# Patient Record
Sex: FEMALE | Race: WHITE
[De-identification: names, ages, dates, MRNs, and addresses within clinical notes are randomized per-mention and may not be internally consistent; named-entity substitution may affect disease eponyms.]

---

## 2022-07-15 ENCOUNTER — HOSPITAL ENCOUNTER (EMERGENCY)
Dept: HOSPITAL 33 - ED | Age: 27
Discharge: HOME | End: 2022-07-15
Payer: COMMERCIAL

## 2022-07-15 VITALS — DIASTOLIC BLOOD PRESSURE: 75 MMHG | SYSTOLIC BLOOD PRESSURE: 113 MMHG | HEART RATE: 68 BPM

## 2022-07-15 VITALS — OXYGEN SATURATION: 99 %

## 2022-07-15 DIAGNOSIS — Z79.899: ICD-10-CM

## 2022-07-15 DIAGNOSIS — R10.9: ICD-10-CM

## 2022-07-15 DIAGNOSIS — R73.9: ICD-10-CM

## 2022-07-15 DIAGNOSIS — R19.7: ICD-10-CM

## 2022-07-15 DIAGNOSIS — R11.2: Primary | ICD-10-CM

## 2022-07-15 DIAGNOSIS — E86.0: ICD-10-CM

## 2022-07-15 DIAGNOSIS — I10: ICD-10-CM

## 2022-07-15 LAB
ALBUMIN SERPL-MCNC: 3.9 G/DL (ref 3.5–5)
ALP SERPL-CCNC: 65 U/L (ref 38–126)
ALT SERPL-CCNC: 19 U/L (ref 0–35)
AMYLASE SERPL-CCNC: 63 U/L (ref 30–110)
ANION GAP SERPL CALC-SCNC: 10.7 MEQ/L (ref 5–15)
AST SERPL QL: 37 U/L (ref 14–36)
BASOPHILS # BLD AUTO: 0.02 X10^3/UL (ref 0–0.4)
BILIRUB BLD-MCNC: 0.6 MG/DL (ref 0.2–1.3)
BUN SERPL-MCNC: 7 MG/DL (ref 7–17)
CALCIUM SPEC-MCNC: 8.9 MG/DL (ref 8.4–10.2)
CHLORIDE SERPL-SCNC: 106 MMOL/L (ref 98–107)
CO2 SERPL-SCNC: 24 MMOL/L (ref 22–30)
CREAT SERPL-MCNC: 0.69 MG/DL (ref 0.52–1.04)
EOSINOPHIL # BLD AUTO: 0.05 X10^3/UL (ref 0–0.5)
GFR SERPLBLD BASED ON 1.73 SQ M-ARVRAT: > 60 ML/MIN
GLUCOSE SERPL-MCNC: 88 MG/DL (ref 74–106)
GLUCOSE UR-MCNC: NEGATIVE MG/DL
HCT VFR BLD AUTO: 43.9 % (ref 35–47)
HGB BLD-MCNC: 14.4 G/DL (ref 12–16)
LYMPHOCYTES # SPEC AUTO: 1.78 X10^3/UL (ref 1–4.6)
MCH RBC QN AUTO: 28.9 PG (ref 26–32)
MCHC RBC AUTO-ENTMCNC: 32.8 G/DL (ref 32–36)
MONOCYTES # BLD AUTO: 0.36 X10^3/UL (ref 0–1.3)
PLATELET # BLD AUTO: 239 X10^3/UL (ref 150–450)
POTASSIUM SERPLBLD-SCNC: 3.6 MMOL/L (ref 3.5–5.1)
PROT SERPL-MCNC: 6.9 G/DL (ref 6.3–8.2)
PROT UR STRIP-MCNC: NEGATIVE MG/DL
RBC # BLD AUTO: 4.99 X10^6/UL (ref 4.1–5.4)
RBC # UR AUTO: NEGATIVE ERY/UL (ref 0–5)
RBC #/AREA URNS HPF: (no result) /HPF (ref 0–2)
SODIUM SERPL-SCNC: 137 MMOL/L (ref 137–145)
UA DIPSTICK PNL UR: (no result)
URINE CULTURED INDICATED?: NO
WBC # BLD AUTO: 6.4 X10^3/UL (ref 4–10.5)
WBC #/AREA URNS HPF: (no result) /HPF (ref 0–5)

## 2022-07-15 PROCEDURE — 96375 TX/PRO/DX INJ NEW DRUG ADDON: CPT

## 2022-07-15 PROCEDURE — 81025 URINE PREGNANCY TEST: CPT

## 2022-07-15 PROCEDURE — 96360 HYDRATION IV INFUSION INIT: CPT

## 2022-07-15 PROCEDURE — 83605 ASSAY OF LACTIC ACID: CPT

## 2022-07-15 PROCEDURE — 36415 COLL VENOUS BLD VENIPUNCTURE: CPT

## 2022-07-15 PROCEDURE — 36000 PLACE NEEDLE IN VEIN: CPT

## 2022-07-15 PROCEDURE — 82150 ASSAY OF AMYLASE: CPT

## 2022-07-15 PROCEDURE — 85025 COMPLETE CBC W/AUTO DIFF WBC: CPT

## 2022-07-15 PROCEDURE — 96374 THER/PROPH/DIAG INJ IV PUSH: CPT

## 2022-07-15 PROCEDURE — 80053 COMPREHEN METABOLIC PANEL: CPT

## 2022-07-15 PROCEDURE — 81015 MICROSCOPIC EXAM OF URINE: CPT

## 2022-07-15 PROCEDURE — 99284 EMERGENCY DEPT VISIT MOD MDM: CPT

## 2022-07-15 NOTE — ERPHSYRPT
- History of Present Illness


Time Seen by Provider: 07/15/22 16:40


Patient Subjective Stated Complaint: PT SENT OVER FOR HIGH BLOOD SUGAR AND HIGH 

KCL TODAY, SHE WAS SEEN AT OFFICE FOR VOMITING, LOOSE STOOLS AND ABD PAIN FOR 3 

WEEKS, SHE WAS IN OUTPT FOR IV FLUIDS,


Triage Nursing Assessment: PT ALERT, ARRIVED PER WC, RESP EASY, FACE MASK IN 

PLACE, CO THRIST,


Physician History: 





This is a 27-year-old white female patient who was sent to us because of 3-week 

history of vomiting and diarrhea as well as some abdominal pain.  Patient had a 

recent CAT scan of the abdomen pelvis which showed diverticulosis but no acute 

findings.  Patient's symptoms have been persistent over this period of time and 

she was seen in the outpatient clinic where a blood sugar was taken and was 

found to be over 600 and a potassium reported as over 6.  Therefore, the patient

was sent to our emergency department for further evaluation and work-up.  Upon 

arrival to the emergency department, the patient is alert oriented pleasant with

no severe abdominal pain and had an Accu-Chek reading of a blood sugar of 86.  

Patient has no chest pain.  She has no shortness of breath.  Patient does have a

history of psychiatric issues and she has a history of high blood pressure.  

Patient states that they performed viral swabs earlier today and they were all 

negative per her report.


Severity: mild


Associated Symptoms: vomiting, other (Diarrhea)


Allergies/Adverse Reactions: 








bupropion [From Wellbutrin] Allergy (Severe, Verified 07/15/22 16:33)


   Tightness of Throat


Sulfa (Sulfonamide Antibiotics) Allergy (Severe, Verified 07/15/22 16:33)


   Difficulty Breathing


latex Allergy (Mild, Verified 07/15/22 16:33)


   Swelling





Home Medications: 








Fluvoxamine Maleate 50 mg PO DAILY 07/15/22 [History]


Metoprolol Succinate 25 mg Xl* [Toprol-Xl 25MG Tablets***] 25 mg PO DAILY 

07/15/22 [History]


Trazodone HCl 50 mg*** [Desyrel 50 mg***] 50 mg PO DAILY 07/15/22 [History]


lamoTRIgine [Lamictal] 50 mg PO DAILY 07/15/22 [History]





Hx Tetanus, Diphtheria Vaccination/Date Given: No


Hx Influenza Vaccination/Date Given: Yes


Hx Pneumococcal Vaccination/Date Given: No


Immunizations Up to Date: Yes





Travel Risk





- International Travel


Have you traveled outside of the country in past 3 weeks: No





- Coronavirus Screening


Are you exhibiting any of the following symptoms?: No


Symptoms: Vomiting/Diarrhea


Close contact with a COVID-19 positive Pt in past 14-21 Days: No





- Vaccine Status


Have you recieved a Covid-19 vaccination: Yes


: Pfizer





- Vaccination Dates


Date of 2cond Vaccination (if applicable): 2021





- Review of Systems


Constitutional: No Symptoms


Eyes: No Symptoms


Ears, Nose, & Throat: No Symptoms


Respiratory: No Symptoms


Cardiac: No Symptoms


Abdominal/Gastrointestinal: Abdominal Pain, Nausea, Vomiting, Diarrhea, No 

Constipation


Genitourinary Symptoms: No Symptoms


Musculoskeletal: No Symptoms


Skin: No Symptoms


Neurological: No Symptoms


Psychological: No Symptoms


Endocrine: No Symptoms


Hematologic/Lymphatic: No Symptoms


Immunological/Allergic: No Symptoms


All Other Systems: Reviewed and Negative





- Past Medical History


Pertinent Past Medical History: Yes


Cardiac History: Hypertension, Other


Endocrine Medical History: Hypothyroidism


GI Medical History: Diverticulosis


Female Reproductive Disorders: Endometriosis


Other Medical History: mitral valve prolapse





- Past Surgical History


Past Surgical History: Yes


Gastrointestinal: Cholecystectomy


Female Surgical History: Other


Other Surgical History: TONSIL AND ADNOIDS.  endometriosis x 2





- Social History


Smoking Status: Never smoker


Exposure to second hand smoke: Yes (clients at her home)


Drug Use: none


Patient Lives Alone: Yes





- Female History


Hx Last Menstrual Period: TODAY


Hx Pregnant Now: No





- Nursing Vital Signs


Nursing Vital Signs: 


                               Initial Vital Signs











Pulse Rate  69   07/15/22 17:57


 


Respiratory Rate  18   07/15/22 17:57


 


O2 Sat by Pulse Oximetry  98   07/15/22 17:57








                                   Pain Scale











Pain Intensity                 0

















- Physical Exam


General Appearance: no apparent distress, alert


Eye Exam: PERRL/EOMI, eyes nml inspection


Ears, Nose, Throat Exam: normal ENT inspection, moist mucous membranes


Neck Exam: normal inspection, non-tender, supple, full range of motion


Respiratory Exam: normal breath sounds, lungs clear, airway intact, No chest 

tenderness, No respiratory distress


Cardiovascular Exam: regular rate/rhythm, normal heart sounds, normal peripheral

 pulses


Gastrointestinal/Abdomen Exam: soft, normal bowel sounds, tenderness (Very mild 

epigastric discomfort with deep palpation), No guarding, No rebound


Pelvic Exam: not done


Rectal Exam: not done


Back Exam: normal inspection, normal range of motion, No CVA tenderness, No 

vertebral tenderness


Extremity Exam: normal inspection, normal range of motion, pelvis stable


Neurologic Exam: alert, oriented x 3, cooperative, CNs II-XII nml as tested, 

normal mood/affect, nml cerebellar function, nml station & gait, sensation nml


Skin Exam: normal color, warm, dry


Lymphatic Exam: No adenopathy


**SpO2 Interpretation**: normal


O2 Delivery: Room Air





- Course


Nursing assessment & vital signs reviewed: Yes


Ordered Tests: 


                               Active Orders 24 hr











 Category Date Time Status


 


 IV Insertion STAT Care  07/15/22 16:40 Active


 


 AMYLASE Stat Lab  07/15/22 17:00 Completed


 


 CBC W DIFF Stat Lab  07/15/22 17:00 Completed


 


 CMP Stat Lab  07/15/22 17:00 Completed


 


 HCG,QUALITATIVE URINE Stat Lab  07/15/22 18:00 Completed


 


 Lactic Acid Stat Lab  07/15/22 16:40 Completed


 


 UA W/RFX CULTURE Stat Lab  07/15/22 18:00 Completed








Medication Summary














Discontinued Medications














Generic Name Dose Route Start Last Admin





  Trade Name Freq  PRN Reason Stop Dose Admin


 


Sodium Chloride  1,000 mls @ 999 mls/hr  07/15/22 16:40  07/15/22 18:52





  Sodium Chloride 0.9% 1000 Ml  IV  07/15/22 17:40  Infused





  .Q1H1M STA   Infusion


 


Sodium Chloride  Confirm  07/15/22 17:31 





  Sodium Chloride 0.9% 1000 Ml  Administered  07/15/22 17:32 





  Dose  





  1,000 mls @ ud  





  .ROUTE  





  .STK-MED ONE  


 


Ondansetron HCl  4 mg  07/15/22 16:40  07/15/22 17:33





  Ondansetron Hcl 4 Mg/2 Ml Vial  IV  07/15/22 16:41  4 mg





  STAT ONE   Administration


 


Ondansetron HCl  Confirm  07/15/22 17:31 





  Ondansetron Hcl 4 Mg/2 Ml Vial  Administered  07/15/22 17:32 





  Dose  





  4 mg  





  .ROUTE  





  .STK-MED ONE  


 


Pantoprazole Sodium  40 mg  07/15/22 16:40  07/15/22 17:33





  Pantoprazole 40 Mg Vial  IV  07/15/22 16:41  40 mg





  STAT ONE   Administration


 


Pantoprazole Sodium  Confirm  07/15/22 17:31 





  Pantoprazole 40 Mg Vial  Administered  07/15/22 17:32 





  Dose  





  40 mg  





  IV  





  .STK-MED ONE  











Lab/Rad Data: 


                           Laboratory Result Diagrams





                                 07/15/22 17:00 





                                 07/15/22 17:00 





                               Laboratory Results











  07/15/22 07/15/22 07/15/22 Range/Units





  18:00 18:00 17:00 


 


WBC     (4.0-10.5)  x10^3/uL


 


RBC     (4.1-5.4)  x10^6/uL


 


Hgb     (12.0-16.0)  g/dL


 


Hct     (35-47)  %


 


MCV     ()  fL


 


MCH     (26-32)  pg


 


MCHC     (32-36)  g/dL


 


RDW     (11.5-14.0)  %


 


Plt Count     (150-450)  x10^3/uL


 


MPV     (7.5-11.0)  fL


 


Gran %     (36.0-66.0)  %


 


Immature Gran % (Auto)     (0.00-0.4)  %


 


Nucleat RBC Rel Count     (0.00-0.1)  %


 


Eos # (Auto)     (0-0.5)  x10^3/uL


 


Immature Gran # (Auto)     (0.00-0.03)  x10^3u/L


 


Absolute Lymphs (auto)     (1.0-4.6)  x10^3/uL


 


Absolute Monos (auto)     (0.0-1.3)  x10^3/uL


 


Absolute Nucleated RBC     (0.00-0.01)  x10^3u/L


 


Lymphocytes %     (24.0-44.0)  %


 


Monocytes %     (0.0-12.0)  %


 


Eosinophils %     (0.00-5.0)  %


 


Basophils %     (0.0-0.4)  %


 


Absolute Granulocytes     (1.4-6.9)  x10^3/uL


 


Basophils #     (0-0.4)  x10^3/uL


 


Sodium    137  (137-145)  mmol/L


 


Potassium    3.6  D  (3.5-5.1)  mmol/L


 


Chloride    106  ()  mmol/L


 


Carbon Dioxide    24  (22-30)  mmol/L


 


Anion Gap    10.7  (5-15)  MEQ/L


 


BUN    7  (7-17)  mg/dL


 


Creatinine    0.69  (0.52-1.04)  mg/dL


 


Estimated GFR    > 60.0  ML/MIN


 


Glucose    88  ()  mg/dL


 


Lactic Acid     (0.4-2.0)  


 


Calcium    8.9  D  (8.4-10.2)  mg/dL


 


Total Bilirubin    0.60  (0.2-1.3)  mg/dL


 


AST    37 H  (14-36)  U/L


 


ALT    19  (0-35)  U/L


 


Alkaline Phosphatase    65  ()  U/L


 


Serum Total Protein    6.9  (6.3-8.2)  g/dL


 


Albumin    3.9  (3.5-5.0)  g/dL


 


Amylase    63  ()  U/L


 


Urinalys Dipstick Clnc  MAIN LAB    


 


Urine Color  YELLOW    (YELLOW)  


 


Urine Appearance  CLEAR    (CLEAR)  


 


Urine pH  6.5    (5-6)  


 


Ur Specific Gravity  1.025    (1.005-1.025)  


 


POC Urine Protein Conf  NEGATIVE    (Negative)  


 


Urine Ketones  TRACE    (NEGATIVE)  


 


Urine Nitrite  NEGATIVE    (NEGATIVE)  


 


Urine Bilirubin  NEGATIVE    (NEGATIVE)  


 


Urine Urobilinogen  0.2    (0-1)  mg/dL


 


Urine Leukocytes  NEGATIVE    (NEGATIVE)  


 


Urine WBC (Auto)  0-2    (0-5)  /HPF


 


Urine RBC (Auto)  0-2    (0-2)  /HPF


 


U Epithel Cells (Auto)  RARE    (FEW)  /HPF


 


Urine RBC  NEGATIVE    (0-5)  Musa/ul


 


Urine Mucus (Auto)  SLIGHT    (NEGATIVE)  /HPF


 


Ur Culture Indicated?  NO    


 


Urine Glucose  NEGATIVE    (NEGATIVE)  mg/dL


 


Urine HCG, Qual   NEGATIVE   (Negative)  














  07/15/22 07/15/22 Range/Units





  17:00 16:40 


 


WBC  6.4   (4.0-10.5)  x10^3/uL


 


RBC  4.99   (4.1-5.4)  x10^6/uL


 


Hgb  14.4  D   (12.0-16.0)  g/dL


 


Hct  43.9   (35-47)  %


 


MCV  88.0   ()  fL


 


MCH  28.9   (26-32)  pg


 


MCHC  32.8   (32-36)  g/dL


 


RDW  11.9   (11.5-14.0)  %


 


Plt Count  239  D   (150-450)  x10^3/uL


 


MPV  9.7   (7.5-11.0)  fL


 


Gran %  64.6   (36.0-66.0)  %


 


Immature Gran % (Auto)  0.6 H   (0.00-0.4)  %


 


Nucleat RBC Rel Count  0.0   (0.00-0.1)  %


 


Eos # (Auto)  0.05   (0-0.5)  x10^3/uL


 


Immature Gran # (Auto)  0.04 H   (0.00-0.03)  x10^3u/L


 


Absolute Lymphs (auto)  1.78   (1.0-4.6)  x10^3/uL


 


Absolute Monos (auto)  0.36   (0.0-1.3)  x10^3/uL


 


Absolute Nucleated RBC  0.00   (0.00-0.01)  x10^3u/L


 


Lymphocytes %  28.0   (24.0-44.0)  %


 


Monocytes %  5.7   (0.0-12.0)  %


 


Eosinophils %  0.8   (0.00-5.0)  %


 


Basophils %  0.3   (0.0-0.4)  %


 


Absolute Granulocytes  4.10   (1.4-6.9)  x10^3/uL


 


Basophils #  0.02   (0-0.4)  x10^3/uL


 


Sodium    (137-145)  mmol/L


 


Potassium    (3.5-5.1)  mmol/L


 


Chloride    ()  mmol/L


 


Carbon Dioxide    (22-30)  mmol/L


 


Anion Gap    (5-15)  MEQ/L


 


BUN    (7-17)  mg/dL


 


Creatinine    (0.52-1.04)  mg/dL


 


Estimated GFR    ML/MIN


 


Glucose    ()  mg/dL


 


Lactic Acid   1.2  (0.4-2.0)  


 


Calcium    (8.4-10.2)  mg/dL


 


Total Bilirubin    (0.2-1.3)  mg/dL


 


AST    (14-36)  U/L


 


ALT    (0-35)  U/L


 


Alkaline Phosphatase    ()  U/L


 


Serum Total Protein    (6.3-8.2)  g/dL


 


Albumin    (3.5-5.0)  g/dL


 


Amylase    ()  U/L


 


Urinalys Dipstick Clnc    


 


Urine Color    (YELLOW)  


 


Urine Appearance    (CLEAR)  


 


Urine pH    (5-6)  


 


Ur Specific Gravity    (1.005-1.025)  


 


POC Urine Protein Conf    (Negative)  


 


Urine Ketones    (NEGATIVE)  


 


Urine Nitrite    (NEGATIVE)  


 


Urine Bilirubin    (NEGATIVE)  


 


Urine Urobilinogen    (0-1)  mg/dL


 


Urine Leukocytes    (NEGATIVE)  


 


Urine WBC (Auto)    (0-5)  /HPF


 


Urine RBC (Auto)    (0-2)  /HPF


 


U Epithel Cells (Auto)    (FEW)  /HPF


 


Urine RBC    (0-5)  Musa/ul


 


Urine Mucus (Auto)    (NEGATIVE)  /HPF


 


Ur Culture Indicated?    


 


Urine Glucose    (NEGATIVE)  mg/dL


 


Urine HCG, Qual    (Negative)  














- Progress


Progress: improved


Counseled pt/family regarding: lab results, diagnosis, need for follow-up





- Departure


Departure Disposition: Home


Clinical Impression: 


 Vomiting and diarrhea, Mild dehydration





Condition: Stable


Critical Care Time: No


Referrals: 


LORI HOLDEN,  [Primary Care Provider] - Follow up/PCP as directed


Additional Instructions: 


Drink plenty of clear liquids.  Do not advance your diet until you are drinking 

clear liquids well.  Take your medication as prescribed.  Follow-up with your 

primary care provider for further management evaluation.


Prescriptions: 


Ondansetron ODT 4 MG*** [Zofran Odt 4 mg***] 4 mg PO Q6H PRN PRN #10 tablet


 PRN Reason: Vomiting

## 2022-09-16 ENCOUNTER — HOSPITAL ENCOUNTER (EMERGENCY)
Dept: HOSPITAL 33 - ED | Age: 27
Discharge: HOME | End: 2022-09-16
Payer: COMMERCIAL

## 2022-09-16 VITALS — SYSTOLIC BLOOD PRESSURE: 123 MMHG | DIASTOLIC BLOOD PRESSURE: 87 MMHG | OXYGEN SATURATION: 99 %

## 2022-09-16 VITALS — HEART RATE: 60 BPM

## 2022-09-16 DIAGNOSIS — Z04.1: Primary | ICD-10-CM

## 2022-09-16 DIAGNOSIS — I10: ICD-10-CM

## 2022-09-16 DIAGNOSIS — Z79.899: ICD-10-CM

## 2022-09-16 DIAGNOSIS — M54.2: ICD-10-CM

## 2022-09-16 DIAGNOSIS — R07.9: ICD-10-CM

## 2022-09-16 DIAGNOSIS — R51.9: ICD-10-CM

## 2022-09-16 PROCEDURE — 72125 CT NECK SPINE W/O DYE: CPT

## 2022-09-16 PROCEDURE — 81025 URINE PREGNANCY TEST: CPT

## 2022-09-16 PROCEDURE — 70450 CT HEAD/BRAIN W/O DYE: CPT

## 2022-09-16 PROCEDURE — 99285 EMERGENCY DEPT VISIT HI MDM: CPT

## 2022-09-16 PROCEDURE — 93005 ELECTROCARDIOGRAM TRACING: CPT

## 2022-09-16 PROCEDURE — 71046 X-RAY EXAM CHEST 2 VIEWS: CPT
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
